# Patient Record
Sex: MALE | Race: WHITE | NOT HISPANIC OR LATINO | Employment: FULL TIME | URBAN - METROPOLITAN AREA
[De-identification: names, ages, dates, MRNs, and addresses within clinical notes are randomized per-mention and may not be internally consistent; named-entity substitution may affect disease eponyms.]

---

## 2019-08-29 ENCOUNTER — HOSPITAL ENCOUNTER (EMERGENCY)
Facility: HOSPITAL | Age: 39
Discharge: HOME | End: 2019-08-29
Attending: EMERGENCY MEDICINE
Payer: COMMERCIAL

## 2019-08-29 VITALS
OXYGEN SATURATION: 97 % | DIASTOLIC BLOOD PRESSURE: 68 MMHG | RESPIRATION RATE: 20 BRPM | WEIGHT: 195 LBS | SYSTOLIC BLOOD PRESSURE: 133 MMHG | BODY MASS INDEX: 25.84 KG/M2 | HEART RATE: 83 BPM | TEMPERATURE: 98 F | HEIGHT: 73 IN

## 2019-08-29 DIAGNOSIS — J02.9 PHARYNGITIS, UNSPECIFIED ETIOLOGY: Primary | ICD-10-CM

## 2019-08-29 LAB
ALBUMIN SERPL BCP-MCNC: 4.6 G/DL (ref 3.5–5.2)
ALP SERPL-CCNC: 98 U/L (ref 55–135)
ALT SERPL W/O P-5'-P-CCNC: 31 U/L (ref 10–44)
ANION GAP SERPL CALC-SCNC: 13 MMOL/L (ref 8–16)
AST SERPL-CCNC: 24 U/L (ref 10–40)
BASOPHILS # BLD AUTO: 0.05 K/UL (ref 0–0.2)
BASOPHILS NFR BLD: 0.4 % (ref 0–1.9)
BILIRUB SERPL-MCNC: 1.2 MG/DL (ref 0.1–1)
BUN SERPL-MCNC: 16 MG/DL (ref 6–20)
BUN SERPL-MCNC: 16 MG/DL (ref 6–30)
CALCIUM SERPL-MCNC: 9.4 MG/DL (ref 8.7–10.5)
CHLORIDE SERPL-SCNC: 101 MMOL/L (ref 95–110)
CHLORIDE SERPL-SCNC: 99 MMOL/L (ref 95–110)
CO2 SERPL-SCNC: 24 MMOL/L (ref 23–29)
CREAT SERPL-MCNC: 1 MG/DL (ref 0.5–1.4)
CREAT SERPL-MCNC: 1.1 MG/DL (ref 0.5–1.4)
DEPRECATED S PYO AG THROAT QL EIA: NEGATIVE
DIFFERENTIAL METHOD: ABNORMAL
EOSINOPHIL # BLD AUTO: 0 K/UL (ref 0–0.5)
EOSINOPHIL NFR BLD: 0.3 % (ref 0–8)
ERYTHROCYTE [DISTWIDTH] IN BLOOD BY AUTOMATED COUNT: 11.9 % (ref 11.5–14.5)
EST. GFR  (AFRICAN AMERICAN): >60 ML/MIN/1.73 M^2
EST. GFR  (NON AFRICAN AMERICAN): >60 ML/MIN/1.73 M^2
GLUCOSE SERPL-MCNC: 130 MG/DL (ref 70–110)
GLUCOSE SERPL-MCNC: 132 MG/DL (ref 70–110)
HCT VFR BLD AUTO: 47 % (ref 40–54)
HCT VFR BLD CALC: 46 %PCV (ref 36–54)
HGB BLD-MCNC: 15.4 G/DL (ref 14–18)
IMM GRANULOCYTES # BLD AUTO: 0.04 K/UL (ref 0–0.04)
IMM GRANULOCYTES NFR BLD AUTO: 0.3 % (ref 0–0.5)
LYMPHOCYTES # BLD AUTO: 1 K/UL (ref 1–4.8)
LYMPHOCYTES NFR BLD: 7.3 % (ref 18–48)
MCH RBC QN AUTO: 30.6 PG (ref 27–31)
MCHC RBC AUTO-ENTMCNC: 32.8 G/DL (ref 32–36)
MCV RBC AUTO: 93 FL (ref 82–98)
MONOCYTES # BLD AUTO: 1.2 K/UL (ref 0.3–1)
MONOCYTES NFR BLD: 8.3 % (ref 4–15)
NEUTROPHILS # BLD AUTO: 11.5 K/UL (ref 1.8–7.7)
NEUTROPHILS NFR BLD: 83.4 % (ref 38–73)
NRBC BLD-RTO: 0 /100 WBC
PLATELET # BLD AUTO: 248 K/UL (ref 150–350)
PMV BLD AUTO: 9.6 FL (ref 9.2–12.9)
POC IONIZED CALCIUM: 1.11 MMOL/L (ref 1.06–1.42)
POC TCO2 (MEASURED): 25 MMOL/L (ref 23–29)
POTASSIUM BLD-SCNC: 3.8 MMOL/L (ref 3.5–5.1)
POTASSIUM SERPL-SCNC: 3.9 MMOL/L (ref 3.5–5.1)
PROT SERPL-MCNC: 7.6 G/DL (ref 6–8.4)
RBC # BLD AUTO: 5.04 M/UL (ref 4.6–6.2)
SAMPLE: ABNORMAL
SODIUM BLD-SCNC: 138 MMOL/L (ref 136–145)
SODIUM SERPL-SCNC: 138 MMOL/L (ref 136–145)
WBC # BLD AUTO: 13.8 K/UL (ref 3.9–12.7)

## 2019-08-29 PROCEDURE — 96374 THER/PROPH/DIAG INJ IV PUSH: CPT

## 2019-08-29 PROCEDURE — 80053 COMPREHEN METABOLIC PANEL: CPT

## 2019-08-29 PROCEDURE — 85025 COMPLETE CBC W/AUTO DIFF WBC: CPT

## 2019-08-29 PROCEDURE — 87081 CULTURE SCREEN ONLY: CPT

## 2019-08-29 PROCEDURE — 99284 EMERGENCY DEPT VISIT MOD MDM: CPT | Mod: ,,, | Performed by: EMERGENCY MEDICINE

## 2019-08-29 PROCEDURE — 63600175 PHARM REV CODE 636 W HCPCS: Performed by: EMERGENCY MEDICINE

## 2019-08-29 PROCEDURE — 87880 STREP A ASSAY W/OPTIC: CPT

## 2019-08-29 PROCEDURE — 99285 EMERGENCY DEPT VISIT HI MDM: CPT | Mod: 25

## 2019-08-29 PROCEDURE — 25500020 PHARM REV CODE 255: Performed by: EMERGENCY MEDICINE

## 2019-08-29 PROCEDURE — 96361 HYDRATE IV INFUSION ADD-ON: CPT

## 2019-08-29 PROCEDURE — 99284 PR EMERGENCY DEPT VISIT,LEVEL IV: ICD-10-PCS | Mod: ,,, | Performed by: EMERGENCY MEDICINE

## 2019-08-29 PROCEDURE — 96375 TX/PRO/DX INJ NEW DRUG ADDON: CPT

## 2019-08-29 RX ORDER — CEFTRIAXONE 1 G/1
1 INJECTION, POWDER, FOR SOLUTION INTRAMUSCULAR; INTRAVENOUS
Status: COMPLETED | OUTPATIENT
Start: 2019-08-29 | End: 2019-08-29

## 2019-08-29 RX ORDER — KETOROLAC TROMETHAMINE 30 MG/ML
15 INJECTION, SOLUTION INTRAMUSCULAR; INTRAVENOUS
Status: COMPLETED | OUTPATIENT
Start: 2019-08-29 | End: 2019-08-29

## 2019-08-29 RX ORDER — DEXAMETHASONE SODIUM PHOSPHATE 4 MG/ML
12 INJECTION, SOLUTION INTRA-ARTICULAR; INTRALESIONAL; INTRAMUSCULAR; INTRAVENOUS; SOFT TISSUE
Status: COMPLETED | OUTPATIENT
Start: 2019-08-29 | End: 2019-08-29

## 2019-08-29 RX ORDER — CLINDAMYCIN HYDROCHLORIDE 150 MG/1
300 CAPSULE ORAL 4 TIMES DAILY
Qty: 56 CAPSULE | Refills: 0 | Status: SHIPPED | OUTPATIENT
Start: 2019-08-29 | End: 2019-09-05

## 2019-08-29 RX ORDER — PREDNISONE 50 MG/1
50 TABLET ORAL DAILY
Qty: 10 TABLET | Refills: 0 | Status: SHIPPED | OUTPATIENT
Start: 2019-08-29 | End: 2019-09-08

## 2019-08-29 RX ADMIN — CEFTRIAXONE SODIUM 1 G: 1 INJECTION, POWDER, FOR SOLUTION INTRAMUSCULAR; INTRAVENOUS at 02:08

## 2019-08-29 RX ADMIN — KETOROLAC TROMETHAMINE 15 MG: 30 INJECTION, SOLUTION INTRAMUSCULAR at 02:08

## 2019-08-29 RX ADMIN — IOHEXOL 100 ML: 350 INJECTION, SOLUTION INTRAVENOUS at 03:08

## 2019-08-29 RX ADMIN — SODIUM CHLORIDE 1000 ML: 0.9 INJECTION, SOLUTION INTRAVENOUS at 02:08

## 2019-08-29 RX ADMIN — DEXAMETHASONE SODIUM PHOSPHATE 12 MG: 4 INJECTION, SOLUTION INTRAMUSCULAR; INTRAVENOUS at 02:08

## 2019-08-29 NOTE — PROVIDER PROGRESS NOTES - EMERGENCY DEPT.
Signed out to me from previous attending pending CT.  CT shows no sign of abscess.  Patient tolerating his secretions.  Will discharge with clindamycin and prednisone.  Advised pt to follow up with PCP or return if concerning symptoms arise. Pt understands and agrees with plan. Will d/c home.

## 2019-08-29 NOTE — ED TRIAGE NOTES
"Pt reports sore throat x8 hours. "I think I'm going to keel over from pain." Pt reports increased pain when swallowing. Reports "stress induced neck pain." Patient appears anxious. Denies cough, nvd, sob, cp, fevers.  "

## 2019-08-29 NOTE — ED PROVIDER NOTES
Encounter Date: 8/29/2019       History     Chief Complaint   Patient presents with    Sore Throat     Pt to ER via pov c/o sore throat with cervical lymph node swelling starting yesterday. Denies fevers at home.      39-year-old male presents with sore throat. He started with sore throat yesterday afternoon.  It was mild to moderate when he went to bed but he woke up with severe pain and to his anterior and right lateral neck.  It hurts to push on it.  He is able to swallow.  His voice feels slightly muffled.  He denies any shortness of breath.  There is no headache.        Review of patient's allergies indicates:  No Known Allergies  No past medical history on file.  No past surgical history on file.  No family history on file.  Social History     Tobacco Use    Smoking status: Not on file   Substance Use Topics    Alcohol use: Not on file    Drug use: Not on file     Review of Systems   Constitutional: Positive for chills and fever.   HENT: Positive for sore throat and trouble swallowing. Negative for congestion and drooling.    Eyes: Negative for visual disturbance.   Respiratory: Negative for cough and shortness of breath.    Cardiovascular: Negative for chest pain.   Gastrointestinal: Negative for abdominal pain.   Genitourinary: Negative for difficulty urinating.   Musculoskeletal: Positive for arthralgias and myalgias.   Neurological: Negative for dizziness and headaches.   Hematological: Positive for adenopathy.   Psychiatric/Behavioral: Negative for agitation.       Physical Exam     Initial Vitals [08/29/19 0103]   BP Pulse Resp Temp SpO2   137/86 88 18 98 °F (36.7 °C) 100 %      MAP       --         Physical Exam    Constitutional: He appears well-developed and well-nourished.   HENT:   Head: Normocephalic.   Patient is handling his secretions well.  There is no stridor.  He does have posterior pharyngitis   Eyes: EOM are normal. Pupils are equal, round, and reactive to light. No scleral icterus.    Neck: Normal range of motion. Neck supple.   Anterior neck is tender   Cardiovascular: Normal rate, regular rhythm, normal heart sounds and intact distal pulses.   Pulmonary/Chest: Breath sounds normal. No respiratory distress.   Abdominal: Soft. Bowel sounds are normal. He exhibits no distension. There is no tenderness.   Musculoskeletal: Normal range of motion. He exhibits no edema or tenderness.   Lymphadenopathy:     He has cervical adenopathy.   Neurological: He is alert. He has normal strength. GCS score is 15. GCS eye subscore is 4. GCS verbal subscore is 5. GCS motor subscore is 6.   Skin: Skin is warm. Capillary refill takes less than 2 seconds. No rash noted.   Psychiatric: He has a normal mood and affect.         ED Course   Procedures  Labs Reviewed   CBC W/ AUTO DIFFERENTIAL - Abnormal; Notable for the following components:       Result Value    WBC 13.80 (*)     Gran # (ANC) 11.5 (*)     Mono # 1.2 (*)     Gran% 83.4 (*)     Lymph% 7.3 (*)     All other components within normal limits   COMPREHENSIVE METABOLIC PANEL - Abnormal; Notable for the following components:    Glucose 130 (*)     Total Bilirubin 1.2 (*)     All other components within normal limits   ISTAT PROCEDURE - Abnormal; Notable for the following components:    POC Glucose 132 (*)     All other components within normal limits   THROAT SCREEN, RAPID   CULTURE, STREP A,  THROAT          Imaging Results          CT Soft Tissue Neck With Contrast (Final result)  Result time 08/29/19 04:09:00    Final result by Tolu Duvall MD (08/29/19 04:09:00)                 Impression:      No acute abnormality.    Electronically signed by resident: Justus Pereira  Date:    08/29/2019  Time:    03:44    Electronically signed by: Tolu Duvall MD  Date:    08/29/2019  Time:    04:09             Narrative:    EXAMINATION:  CT SOFT TISSUE NECK WITH CONTRAST    CLINICAL HISTORY:  Sore throat/stridor, epiglottis or tonsillitis  suspected;    TECHNIQUE:  Low dose axial images as well as sagittal and coronal reconstructions were performed from the skull base to the clavicles following the intravenous administration of 100 mL of Omnipaque 350.    COMPARISON:  None.    FINDINGS:  Skull Base and Brain (limited evaluation): No obvious abnormality.    Sinuses and Mastoid Air Cells: Essentially clear    Salivary Glands: Normal.    Thyroid: Normal.    Cervical Lymph Nodes: No pathologic enlargement.    Pharynx/Larynx: Normal, with preserved fat planes.    Vasculature (limited evaluation): No obvious filling defect.    Upper Airways and Lungs: Clear.    Bones: No acute fracture or suspicious lytic or sclerotic lesion.                                 Medical Decision Making:   History:   Old Medical Records: I decided to obtain old medical records.  Initial Assessment:   Patient with significant pharyngitis and anterior neck tenderness. His neck is supple so I doubt meningitis.  But I am concerned about deep space infection.  We will check labs and CT of soft tissue neck.  Will initiate fluids, steroid, Rocephin is antibiotic.  Differential Diagnosis:   Consider epiglottitis, retropharyngeal abscess, pharyngeal abscess  Clinical Tests:   Lab Tests: Ordered and Reviewed  Radiological Study: Ordered and Reviewed  Other:   I have discussed this case with another health care provider.       <> Summary of the Discussion: Case signed out to Dr. Julio Mcdaniel at 4:00 a.m. with CT pending.  he is in stable condition and I anticipate discharge if CT is reassuring                      Clinical Impression:       ICD-10-CM ICD-9-CM   1. Pharyngitis, unspecified etiology J02.9 462                                Christiano King MD  08/29/19 [id number]

## 2019-08-31 LAB — BACTERIA THROAT CULT: NORMAL
